# Patient Record
Sex: MALE | Race: WHITE | URBAN - METROPOLITAN AREA
[De-identification: names, ages, dates, MRNs, and addresses within clinical notes are randomized per-mention and may not be internally consistent; named-entity substitution may affect disease eponyms.]

---

## 2017-01-31 ENCOUNTER — TRANSFERRED RECORDS (OUTPATIENT)
Dept: PHYSICAL THERAPY | Facility: CLINIC | Age: 52
End: 2017-01-31

## 2017-02-07 ENCOUNTER — THERAPY VISIT (OUTPATIENT)
Dept: PHYSICAL THERAPY | Facility: CLINIC | Age: 52
End: 2017-02-07
Payer: COMMERCIAL

## 2017-02-07 DIAGNOSIS — M54.50 ACUTE RIGHT-SIDED LOW BACK PAIN WITHOUT SCIATICA: Primary | ICD-10-CM

## 2017-02-07 PROCEDURE — 97110 THERAPEUTIC EXERCISES: CPT | Mod: GP | Performed by: PHYSICAL THERAPIST

## 2017-02-07 PROCEDURE — 97161 PT EVAL LOW COMPLEX 20 MIN: CPT | Mod: GP | Performed by: PHYSICAL THERAPIST

## 2017-02-07 NOTE — PROGRESS NOTES
"[facility] Initial Evaluation -- Lumbar    Date: February 7, 2017  Wilner Hylton is a 51 year old male with a lumbar condition.   Referral: primary care  Work mechanical stresses:  Bending and lifting  Employment status:  Working half time in alternate job in [employer] formation  Leisure mechanical stresses: sitting  Functional disability score (KALLI/STarT Back):  See KALLI in flowsheet  VAS score (0-10): 1    HISTORY:    Present symptoms: achey low back pain, \"zaps of pain\" into B buttocks  Pain quality (sharp/shooting/stabbing/aching/burning/cramping):  Achey, sharp   Paresthesia (yes/no):  None, but was happening at onset    Present since: January 25, 2017    Symptoms (improving/unchanging/worsening):  improving.   Symptoms commenced as a result of: bending over to pick something up   Condition occurred in the following environment:   At work      Symptoms at onset (back/thigh/leg): B low back, B hips, B foot numbness, had to go to ER  Constant symptoms (back/thigh/leg): none  Intermittent symptoms (back/thigh/leg): B low back, R buttock    Symptoms are made worse with the following: Always bending, Always sitting and Always standing   Symptoms are made better with the following: Always lying and Other - percocet    Disturbed sleep (yes/no):  none Sleeping postures (prone/sup/side R/L): supine, sides    Previous episodes (0/1-5/6-10/11+): yearly  Year of first episode: 1995    Previous history: n/a  Previous treatments: injection early 2016 (made it worse for month)      Specific Questions:  Cough/Sneeze/Strain (pos/neg): pos  Bowel/Bladder (normal/abnormal): none  Gait (normal/abnormal): slight limp  Medications (nil/NSAIDS/analg/steroids/anticoag/other):  Analgesic, NSAIDs  Medical allergies:  none  General health (excellent/good/fair/poor):  fair  Pertinent medical history:  None  Imaging (NA/Xray/MRI):  2013 MRI \"L4-5 couple of disc bulges\"  Recent or major surgery (yes/no):  none  Night pain " (yes/no): no  Accidents (yes/no): no  Unexplained weight loss (yes/no): no  Barriers at home: no  Other red flags: no    EXAMINATION    Posture:   Sitting (good/fair/poor): poor  Standing (good/fair/poor):fair  Lordosis (red/acc/normal): red  Correction of posture (better/worse/no effect): better    Lateral Shift (right/left/nil): nil  Relevant (yes/no):  n/a  Other Observations: none    Neurological:    Motor deficit:  none  Reflexes:  n/a  Sensory deficit:  none  Dural signs:  n/a    Movement Loss:   Javed Mod Min Nil Pain   Flexion  x   P R LB   Extension   x     Side Gliding R   x  P R LB   Side Gliding L    x      Test Movements:   During: produces, abolishes, increases, decreases, no effect, centralizing, peripheralizing   After: better, worse, no better, no worse, no effect, centralized, peripheralized    Pretest symptoms standing: central LBP    Symptoms During Symptoms After ROM increased ROM decreased No Effect   FIS        Rep FIS        EIS        Rep EIS        Pretest symptoms lying: none    Symptoms During Symptoms After ROM increased ROM decreased No Effect   MAGGY        Rep MAGGY        EIL Produces central low back No worse      Rep EIL Produces, dec on repetition No Effect x     If required, pretest symptoms:    Symptoms During Symptoms After ROM increased ROM decreased No Effect   SGIS - R        Rep SGIS - R        SGIS - L        Rep SGIS - L          Static Tests:  Sitting slouched:  wrose  Sitting erect:  better  Standing slouched n/a  Standing erect:  n/a  Lying prone in extension:  abolishses Long sitting:  n/a    Other Tests: none    Provisional Classification:  Derangement - Asymmetrical, unilateral, symptoms above knee    Principle of Management:  Education:  Lumbar roll, temporary avoidance of flexion   Equipment provided:  none  Mechanical therapy (Y/N):  y   Extension principle:  EIL 10 reps every 2-3 hrs  Lateral Principle:    Flexion principle:    Other:      ASSESSMENT/PLAN:    Patient  is a 51 year old male with lumbar complaints.    Patient has the following significant findings with corresponding treatment plan.                Diagnosis 1:  R lumbar above knee derangement  Pain -  self management, education, directional preference exercise and home program  Decreased ROM/flexibility - manual therapy, therapeutic exercise, therapeutic activity and home program  Inflammation - self management/home program  Impaired muscle performance - neuro re-education and home program  Decreased function - therapeutic activities and home program  Impaired posture - neuro re-education, therapeutic activities and home program    Therapy Evaluation Codes:   1) History comprised of:   Personal factors that impact the plan of care:      Work status.    Comorbidity factors that impact the plan of care are:      None.     Medications impacting care: Pain.  2) Examination of Body Systems comprised of:   Body structures and functions that impact the plan of care:      Lumbar spine.   Activity limitations that impact the plan of care are:      Bending and Lifting.  3) Clinical presentation characteristics are:   Evolving/changing.  4) Decision-Making    Low complexity using standardized patient assessment instrument and/or measureable assessment of functional outcome.  Cumulative Therapy Evaluation is: Low complexity.    Previous and current functional limitations:  (See Goal Flow Sheet for this information)    Short term and Long term goals: (See Goal Flow Sheet for this information)     Communication ability:  Patient appears to be able to clearly communicate and understand verbal and written communication and follow directions correctly.  Treatment Explanation - The following has been discussed with the patient:   RX ordered/plan of care  Anticipated outcomes  Possible risks and side effects  This patient would benefit from PT intervention to resume normal activities.   Rehab potential is excellent.    Frequency:  1 X  week, once daily  Duration:  for 5 weeks  Discharge Plan:  Achieve all LTG.  Independent in home treatment program.  Reach maximal therapeutic benefit.    Please refer to the daily flowsheet for treatment today, total treatment time and time spent performing 1:1 timed codes.